# Patient Record
Sex: FEMALE | Race: WHITE | NOT HISPANIC OR LATINO
[De-identification: names, ages, dates, MRNs, and addresses within clinical notes are randomized per-mention and may not be internally consistent; named-entity substitution may affect disease eponyms.]

---

## 2022-05-09 ENCOUNTER — APPOINTMENT (OUTPATIENT)
Dept: ORTHOPEDIC SURGERY | Facility: CLINIC | Age: 57
End: 2022-05-09

## 2022-05-13 ENCOUNTER — APPOINTMENT (OUTPATIENT)
Dept: ORTHOPEDIC SURGERY | Facility: CLINIC | Age: 57
End: 2022-05-13
Payer: MEDICAID

## 2022-05-13 VITALS — HEIGHT: 65 IN | BODY MASS INDEX: 23.32 KG/M2 | WEIGHT: 140 LBS

## 2022-05-13 DIAGNOSIS — Z78.9 OTHER SPECIFIED HEALTH STATUS: ICD-10-CM

## 2022-05-13 DIAGNOSIS — Z86.79 PERSONAL HISTORY OF OTHER DISEASES OF THE CIRCULATORY SYSTEM: ICD-10-CM

## 2022-05-13 DIAGNOSIS — Z63.5 DISRUPTION OF FAMILY BY SEPARATION AND DIVORCE: ICD-10-CM

## 2022-05-13 PROCEDURE — 99204 OFFICE O/P NEW MOD 45 MIN: CPT

## 2022-05-13 PROCEDURE — 72100 X-RAY EXAM L-S SPINE 2/3 VWS: CPT

## 2022-05-13 SDOH — SOCIAL STABILITY - SOCIAL INSECURITY: DISRUPTION OF FAMILY BY SEPARATION AND DIVORCE: Z63.5

## 2022-05-13 NOTE — DISCUSSION/SUMMARY
[de-identified] : Options were discussed. Patient is ambulating with a cane. Denies any traumatic injury, plan is for MRI to further eval the AVN.\par Entered by Alejandro Cardozo acting as scribe.\par Dr. Zhu Attestation\par The documentation recorded by the scribe, in my presence, accurately reflects the service I, Dr. Zhu, personally performed, and the decisions made by me with my edits as appropriate.\par

## 2022-05-13 NOTE — PHYSICAL EXAM
[No bony abnormalities] : No bony abnormalities [No spinal deformity, fracture, lytic lesion, or marked single level collapse] : No spinal deformity, fracture, lytic lesion, or marked single level collapse [Right] : right hip [AP] : anteroposterior [Lateral] : lateral [Outside films reviewed] : Outside films reviewed [AVN] : AVN

## 2022-05-13 NOTE — HISTORY OF PRESENT ILLNESS
[Gradual] : gradual [Burning] : burning [Radiating] : radiating [Sharp] : sharp [Shooting] : shooting [Stabbing] : stabbing [Throbbing] : throbbing [Tingling] : tingling [Constant] : constant [Household chores] : household chores [Work] : work [Sleep] : sleep [Nothing helps with pain getting better] : Nothing helps with pain getting better [Sitting] : sitting [de-identified] : Right Hip pain radiating all the way down her leg. She can not turn her leg in or out w/o pain sine March 2022.  Dr. Joan eubanks, PCP ordered Groin ultrasound, RT knee, RT Hip xray. Taking Meloxicam for pain. [] : no [FreeTextEntry1] : RT hip [FreeTextEntry7] : side of hip, down to the shin [de-identified] : rotating [de-identified] : RT knee, hip xray. ultrasound groin

## 2022-05-25 ENCOUNTER — RESULT REVIEW (OUTPATIENT)
Age: 57
End: 2022-05-25

## 2022-06-13 ENCOUNTER — APPOINTMENT (OUTPATIENT)
Dept: ORTHOPEDIC SURGERY | Facility: CLINIC | Age: 57
End: 2022-06-13
Payer: MEDICAID

## 2022-06-13 VITALS — WEIGHT: 140 LBS | HEIGHT: 65 IN | BODY MASS INDEX: 23.32 KG/M2

## 2022-06-13 PROCEDURE — 99214 OFFICE O/P EST MOD 30 MIN: CPT

## 2022-06-13 NOTE — HISTORY OF PRESENT ILLNESS
[Gradual] : gradual [Burning] : burning [Radiating] : radiating [Sharp] : sharp [Shooting] : shooting [Stabbing] : stabbing [Throbbing] : throbbing [Tingling] : tingling [Constant] : constant [Household chores] : household chores [Work] : work [Sleep] : sleep [Nothing helps with pain getting better] : Nothing helps with pain getting better [Sitting] : sitting [de-identified] : RT hip MRI review. Took a meloxicam today. Still in pain .  [] : no [FreeTextEntry1] : RT hip [FreeTextEntry7] : side of hip, down to the shin [de-identified] : rotating [de-identified] : RT knee, hip xray. ultrasound groin

## 2022-06-13 NOTE — DATA REVIEWED
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

## 2022-06-13 NOTE — ASSESSMENT
[FreeTextEntry1] : Options were discussed. Long discussion was had in regards to Sx options to treat the AVN noted. It was rec. that patient f/u with Dr. Aguillon to discuss Rt. YURY.\par \par \par Entered by Alejandro Cardozo acting as scribe.\par Dr. Zhu Attestation\par The documentation recorded by the scribe, in my presence, accurately reflects the service I, Dr. Zhu, personally performed, and the decisions made by me with my edits as appropriate.\par

## 2022-06-13 NOTE — PHYSICAL EXAM
[No bony abnormalities] : No bony abnormalities [No spinal deformity, fracture, lytic lesion, or marked single level collapse] : No spinal deformity, fracture, lytic lesion, or marked single level collapse [Right] : right hip [AP] : anteroposterior [Lateral] : lateral [Outside films reviewed] : Outside films reviewed [AVN] : AVN [] : ambulation with cane [de-identified] : Strength is limited on testing [FreeTextEntry9] : ROM is limited in all planes

## 2022-09-09 ENCOUNTER — APPOINTMENT (OUTPATIENT)
Dept: ORTHOPEDIC SURGERY | Facility: CLINIC | Age: 57
End: 2022-09-09

## 2022-09-09 VITALS — HEIGHT: 65 IN | WEIGHT: 140 LBS | BODY MASS INDEX: 23.32 KG/M2

## 2022-09-09 DIAGNOSIS — M87.051 IDIOPATHIC ASEPTIC NECROSIS OF RIGHT FEMUR: ICD-10-CM

## 2022-09-09 PROCEDURE — 99214 OFFICE O/P EST MOD 30 MIN: CPT

## 2022-09-09 NOTE — PHYSICAL EXAM
[No bony abnormalities] : No bony abnormalities [No spinal deformity, fracture, lytic lesion, or marked single level collapse] : No spinal deformity, fracture, lytic lesion, or marked single level collapse [Right] : right hip [AP] : anteroposterior [Lateral] : lateral [Outside films reviewed] : Outside films reviewed [AVN] : AVN [] : no palpable masses [FreeTextEntry9] : ROM is limited in all planes [de-identified] : Strength is limited on testing

## 2022-09-09 NOTE — HISTORY OF PRESENT ILLNESS
[Gradual] : gradual [Burning] : burning [Radiating] : radiating [Sharp] : sharp [Shooting] : shooting [Stabbing] : stabbing [Throbbing] : throbbing [Tingling] : tingling [Constant] : constant [Household chores] : household chores [Work] : work [Sleep] : sleep [Nothing helps with pain getting better] : Nothing helps with pain getting better [Sitting] : sitting [de-identified] : Follow up right hip. Experiencing pain radiating into the groin and down the leg to the knee. Taking Meloxicam PRN. Using cane for ambulation. [] : no [FreeTextEntry1] : RT hip [FreeTextEntry7] : side of hip, down to the shin [de-identified] : rotating [de-identified] : RT knee, hip xray. ultrasound groin

## 2022-12-15 ENCOUNTER — APPOINTMENT (OUTPATIENT)
Dept: OBGYN | Facility: CLINIC | Age: 57
End: 2022-12-15

## 2023-01-06 ENCOUNTER — APPOINTMENT (OUTPATIENT)
Dept: OBGYN | Facility: CLINIC | Age: 58
End: 2023-01-06
Payer: MEDICAID

## 2023-01-06 VITALS
WEIGHT: 130 LBS | SYSTOLIC BLOOD PRESSURE: 124 MMHG | BODY MASS INDEX: 21.66 KG/M2 | HEIGHT: 65 IN | DIASTOLIC BLOOD PRESSURE: 60 MMHG

## 2023-01-06 DIAGNOSIS — Z00.00 ENCOUNTER FOR GENERAL ADULT MEDICAL EXAMINATION W/OUT ABNORMAL FINDINGS: ICD-10-CM

## 2023-01-06 DIAGNOSIS — I10 ESSENTIAL (PRIMARY) HYPERTENSION: ICD-10-CM

## 2023-01-06 DIAGNOSIS — N95.2 POSTMENOPAUSAL ATROPHIC VAGINITIS: ICD-10-CM

## 2023-01-06 DIAGNOSIS — Z87.891 PERSONAL HISTORY OF NICOTINE DEPENDENCE: ICD-10-CM

## 2023-01-06 DIAGNOSIS — Z01.419 ENCOUNTER FOR GYNECOLOGICAL EXAMINATION (GENERAL) (ROUTINE) W/OUT ABNORMAL FINDINGS: ICD-10-CM

## 2023-01-06 LAB
BILIRUB UR QL STRIP: NORMAL
CLARITY UR: CLEAR
COLLECTION METHOD: NORMAL
DATE COLLECTED: NORMAL
GLUCOSE UR-MCNC: NORMAL
HCG UR QL: 0.2 EU/DL
HEMOCCULT SP1 STL QL: NEGATIVE
HGB UR QL STRIP.AUTO: NORMAL
KETONES UR-MCNC: NORMAL
LEUKOCYTE ESTERASE UR QL STRIP: NORMAL
NITRITE UR QL STRIP: NORMAL
PH UR STRIP: 6
PROT UR STRIP-MCNC: NORMAL
QUALITY CONTROL: YES
SP GR UR STRIP: 1.01

## 2023-01-06 PROCEDURE — 99386 PREV VISIT NEW AGE 40-64: CPT

## 2023-01-06 PROCEDURE — 82270 OCCULT BLOOD FECES: CPT

## 2023-01-06 PROCEDURE — 81003 URINALYSIS AUTO W/O SCOPE: CPT | Mod: QW

## 2023-01-06 NOTE — DISCUSSION/SUMMARY
[FreeTextEntry1] :  exam as above.\par Pap smear done.\par I recommended that the patient sees a dermatologist due to significant hair loss that she is concerned about.\par Contact information for Dr. bowen given.\par Follow-up in 1 year if normal results.

## 2023-01-06 NOTE — PHYSICAL EXAM
[Chaperone Present] : A chaperone was present in the examining room during all aspects of the physical examination [FreeTextEntry1] :   Issac toner [Appropriately responsive] : appropriately responsive [Alert] : alert [No Acute Distress] : no acute distress [Soft] : soft [Non-tender] : non-tender [Non-distended] : non-distended [Oriented x3] : oriented x3 [Examination Of The Breasts] : a normal appearance [No Masses] : no breast masses were palpable [Labia Majora] : normal [Labia Minora] : normal [Atrophy] : atrophy [Normal] : normal [Uterine Adnexae] : normal [FreeTextEntry9] : FOBT negative

## 2023-01-06 NOTE — HISTORY OF PRESENT ILLNESS
[Patient reported mammogram was normal] : Patient reported mammogram was normal [Patient reported PAP Smear was normal] : Patient reported PAP Smear was normal [Y] : Patient uses contraception [Condoms] : uses condoms [N] : Patient is not sexually active [Menarche Age: ____] : age at menarche was [unfilled] [Men] : men [FreeTextEntry1] : [place] is a 57-year-old coming in for annual exam.Reports recent significant hair loss.\par LMP around age 48, no PMB\par FMP age 11 \par Last PAP - 2 years ago, normal. No history of abnormal PAP\par Sexually not active\par No history of STDs.\par , NSVDX1\par Mammogram - 1 week ago per patient, normal. \par Colonoscopy - never done. \par PMH -  HTN, necrosis of right hip (planned for surgery)\par PSH - eye surgery \par Allergies - NKDA\par No smoking or drug use, ETOH - none\par Family - unknown\par  [MensesLength] : 5

## 2023-01-09 LAB — HPV HIGH+LOW RISK DNA PNL CVX: NOT DETECTED

## 2023-01-17 LAB — CYTOLOGY CVX/VAG DOC THIN PREP: ABNORMAL
